# Patient Record
Sex: MALE
[De-identification: names, ages, dates, MRNs, and addresses within clinical notes are randomized per-mention and may not be internally consistent; named-entity substitution may affect disease eponyms.]

---

## 2020-02-20 PROBLEM — Z00.00 ENCOUNTER FOR PREVENTIVE HEALTH EXAMINATION: Status: ACTIVE | Noted: 2020-02-20

## 2020-03-06 ENCOUNTER — APPOINTMENT (OUTPATIENT)
Dept: UROLOGY | Facility: CLINIC | Age: 40
End: 2020-03-06
Payer: COMMERCIAL

## 2020-03-06 VITALS
HEART RATE: 68 BPM | BODY MASS INDEX: 25.76 KG/M2 | SYSTOLIC BLOOD PRESSURE: 144 MMHG | DIASTOLIC BLOOD PRESSURE: 87 MMHG | TEMPERATURE: 98 F | WEIGHT: 170 LBS | HEIGHT: 68 IN

## 2020-03-06 DIAGNOSIS — Z80.43 FAMILY HISTORY OF MALIGNANT NEOPLASM OF TESTIS: ICD-10-CM

## 2020-03-06 DIAGNOSIS — Z78.9 OTHER SPECIFIED HEALTH STATUS: ICD-10-CM

## 2020-03-06 DIAGNOSIS — E23.0 HYPOPITUITARISM: ICD-10-CM

## 2020-03-06 DIAGNOSIS — H40.9 UNSPECIFIED GLAUCOMA: ICD-10-CM

## 2020-03-06 DIAGNOSIS — H35.61 RETINAL HEMORRHAGE, RIGHT EYE: ICD-10-CM

## 2020-03-06 DIAGNOSIS — H35.62 RETINAL HEMORRHAGE, LEFT EYE: ICD-10-CM

## 2020-03-06 DIAGNOSIS — L28.0 LICHEN SIMPLEX CHRONICUS: ICD-10-CM

## 2020-03-06 DIAGNOSIS — H44.20 DEGENERATIVE MYOPIA, UNSPECIFIED EYE: ICD-10-CM

## 2020-03-06 LAB
BILIRUB UR QL STRIP: NORMAL
CLARITY UR: CLEAR
COLLECTION METHOD: NORMAL
GLUCOSE UR-MCNC: NORMAL
HCG UR QL: 0.2 EU/DL
HGB UR QL STRIP.AUTO: NORMAL
KETONES UR-MCNC: NORMAL
LEUKOCYTE ESTERASE UR QL STRIP: NORMAL
NITRITE UR QL STRIP: NORMAL
PH UR STRIP: 7
PROT UR STRIP-MCNC: NORMAL
SP GR UR STRIP: 1.01

## 2020-03-06 PROCEDURE — 99204 OFFICE O/P NEW MOD 45 MIN: CPT

## 2020-03-06 PROCEDURE — 81003 URINALYSIS AUTO W/O SCOPE: CPT | Mod: QW

## 2020-03-06 PROCEDURE — 76857 US EXAM PELVIC LIMITED: CPT

## 2020-03-06 RX ORDER — TIMOLOL MALEATE/LATANOPROST/PF 0.5-0.005%
0.005-0.5 DROPS OPHTHALMIC (EYE)
Refills: 0 | Status: ACTIVE | COMMUNITY

## 2020-03-06 NOTE — HISTORY OF PRESENT ILLNESS
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  He presents with a known urethral stricture identified in 2013 during an evaluation for LUTS (with urodynamics and cystoscopy).  At the age of 13 he was hit by a cricket ball in the genital region, resulting in swelling.  In addition, he had been catheterized for the forearm ORIF.  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 He has one healthy child aged 2.5 years.  His wife is 41 years old. The couple are using condoms.

## 2020-03-06 NOTE — ASSESSMENT
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 He will continue to monitor home flow rates and consider a retrograde urethrogram when he returns to [place].\par \par Regarding elective sterilization, I outlined the vasectomy procedure with Dr. Cardenas, including the risks (bleeding, infection, 0.2-0.5% recannulization, chronic testicular pain, questionable increased risk of prostate cancer), benefits and alternatives. The appropriate instructions and consent were provided. He will decide and schedule if he wishes to proceed with the vasectomy. \par \par (He may be relocating to [facility] from [place]).\par

## 2020-03-06 NOTE — PHYSICAL EXAM
[General Appearance - Well Developed] : well developed [General Appearance - Well Nourished] : well nourished [Normal Appearance] : normal appearance [Well Groomed] : well groomed [General Appearance - In No Acute Distress] : no acute distress [Heart Rate And Rhythm] : Heart rate and rhythm were normal [Edema] : no peripheral edema [Respiration, Rhythm And Depth] : normal respiratory rhythm and effort [Exaggerated Use Of Accessory Muscles For Inspiration] : no accessory muscle use [Abdomen Soft] : soft [Abdomen Tenderness] : non-tender [Abdomen Mass (___ Cm)] : no abdominal mass palpated [Abdomen Hernia] : no hernia was discovered [Costovertebral Angle Tenderness] : no ~M costovertebral angle tenderness [Urethral Meatus] : meatus normal [Penis Abnormality] : normal uncircumcised penis [Urinary Bladder Findings] : the bladder was normal on palpation [Scrotum] : the scrotum was normal [Epididymis] : the epididymides were normal [Testes Tenderness] : no tenderness of the testes [Testes Mass (___cm)] : there were no testicular masses [Prostate Tenderness] : the prostate was not tender [No Prostate Nodules] : no prostate nodules [Normal Station and Gait] : the gait and station were normal for the patient's age [Skin Color & Pigmentation] : normal skin color and pigmentation [] : no rash [No Focal Deficits] : no focal deficits [Oriented To Time, Place, And Person] : oriented to person, place, and time [Affect] : the affect was normal [Mood] : the mood was normal [Not Anxious] : not anxious [No Palpable Adenopathy] : no palpable adenopathy [FreeTextEntry1] : Subcentimeter maculaopapular erythematous subcoronal penile lesion. Tight scrotum with 10cc testes bilaterally. Difficulty palpating right vas deferens

## 2022-01-14 ENCOUNTER — APPOINTMENT (OUTPATIENT)
Dept: UROLOGY | Facility: CLINIC | Age: 42
End: 2022-01-14
Payer: COMMERCIAL

## 2022-01-14 PROCEDURE — 99213 OFFICE O/P EST LOW 20 MIN: CPT | Mod: 95

## 2022-01-14 NOTE — ASSESSMENT
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 He will continue to monitor home flow rates and consider a retrograde urethrogram when he returns to [place], possibly this summer.\par \par Regarding the penile lesions, he will continue under the care of his local dermatologist for these.\par \par

## 2022-01-14 NOTE — LETTER BODY
[Dear  ___] : Dear  [unfilled], [Consult Letter:] : I had the pleasure of evaluating your patient, [unfilled]. [Please see my note below.] : Please see my note below. [Consult Closing:] : Thank you very much for allowing me to participate in the care of this patient.  If you have any questions, please do not hesitate to contact me. [Sincerely,] : Sincerely, [FreeTextEntry3] : Yosvany Estrella MD, FACS

## 2022-01-14 NOTE — PHYSICAL EXAM
[General Appearance - Well Developed] : well developed [General Appearance - Well Nourished] : well nourished [Normal Appearance] : normal appearance [Well Groomed] : well groomed [General Appearance - In No Acute Distress] : no acute distress [Costovertebral Angle Tenderness] : no ~M costovertebral angle tenderness [] : no rash [Edema] : no peripheral edema [Affect] : the affect was normal [Mood] : the mood was normal [Not Anxious] : not anxious [Oriented To Time, Place, And Person] : oriented to person, place, and time [FreeTextEntry1] : Subcentimeter maculopapular erythematous subcoronal penile lesion. Tight scrotum with 10cc testes bilaterally. Difficulty palpating right vas deferens

## 2022-01-14 NOTE — HISTORY OF PRESENT ILLNESS
[FreeTextEntry1] : I contacted Mr. JUDITH CARDENAS by telemedicine using the [employer] platform. The patient was located at his  home address in [place].  The appropriate consent was obtained prior to the conference.  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  At the age of 13 he was hit by a cricket ball in the genital region, resulting in swelling.  In addition, he had been catheterized for the forearm ORIF surgery.  \par \par His voiding symptoms (mostly obstructive) have remained stable and  his home flow rates are still at ~20ml/sec.  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  However he has had several additional lesions involving the glans prepuce and distal penile skin, consistent with lichenoid penile dermatitis.  He has been using triamcinolone cream for up to 2 weeks intermittently with improvement. 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 He has one healthy child aged 2.5 years.  His wife is 41 years old. The couple are using condoms.

## 2022-01-14 NOTE — ADDENDUM
[FreeTextEntry1] : A portion of this note was written by [Jimmie Olsen] on 01/13/2022 acting as a scribe for Dr. Estrella. \par \par I have personally reviewed the chart and agree that the record accurately reflects my personal performance of the history, physical exam, assessment, and plan.

## 2022-03-07 ENCOUNTER — NON-APPOINTMENT (OUTPATIENT)
Age: 42
End: 2022-03-07

## 2022-04-01 ENCOUNTER — APPOINTMENT (OUTPATIENT)
Dept: UROLOGY | Facility: CLINIC | Age: 42
End: 2022-04-01
Payer: COMMERCIAL

## 2022-04-01 VITALS
HEIGHT: 69 IN | BODY MASS INDEX: 25.18 KG/M2 | WEIGHT: 170 LBS | DIASTOLIC BLOOD PRESSURE: 107 MMHG | HEART RATE: 102 BPM | TEMPERATURE: 98.2 F | RESPIRATION RATE: 18 BRPM | SYSTOLIC BLOOD PRESSURE: 162 MMHG

## 2022-04-01 DIAGNOSIS — N35.912 UNSPECIFIED BULBOUS URETHRAL STRICTURE, MALE: ICD-10-CM

## 2022-04-01 DIAGNOSIS — R39.9 UNSPECIFIED SYMPTOMS AND SIGNS INVOLVING THE GENITOURINARY SYSTEM: ICD-10-CM

## 2022-04-01 DIAGNOSIS — N48.9 DISORDER OF PENIS, UNSPECIFIED: ICD-10-CM

## 2022-04-01 DIAGNOSIS — N47.1 PHIMOSIS: ICD-10-CM

## 2022-04-01 PROCEDURE — 51610 INJECTION FOR BLADDER X-RAY: CPT

## 2022-04-01 PROCEDURE — 51741 ELECTRO-UROFLOWMETRY FIRST: CPT

## 2022-04-01 PROCEDURE — 74450 X-RAY URETHRA/BLADDER: CPT

## 2022-04-01 PROCEDURE — 81003 URINALYSIS AUTO W/O SCOPE: CPT | Mod: QW

## 2022-04-01 NOTE — PHYSICAL EXAM
[Abdomen Mass (___ Cm)] : no abdominal mass palpated [Urethral Meatus] : meatus normal [Penis Abnormality] : normal uncircumcised penis [Epididymis] : the epididymides were normal [Testes Tenderness] : no tenderness of the testes [Testes Mass (___cm)] : there were no testicular masses [FreeTextEntry1] : Mild distal foreskin erythema.

## 2022-04-01 NOTE — ASSESSMENT
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  Today's urethral imaging was essentially normal although I am not absolutely certain about the proximal most portion of the bulbar urethra.  This would require a cystoscopy, which I do not feel is necessary in view of his stable urinary flow rates.\par \par We agreed that Dr. Cardenas would continue monitoring flow rates at home and call us for any changes.\par \par Regarding the penile lesions that are being evaluated by a local dermatologist, he will continue with the tacrolimus cream as advised.  He will continue to monitor home flow rates and consider a retrograde urethrogram when he returns to [place], possibly this summer.\par \par

## 2022-04-01 NOTE — ADDENDUM
[FreeTextEntry1] : A portion of this note was written by [Jimmie Olsen] on 03/29/2022 acting as a scribe for Dr. Estrella. \par \par I have personally reviewed the chart and agree that the record accurately reflects my personal performance of the history, physical exam, assessment, and plan.

## 2022-04-01 NOTE — HISTORY OF PRESENT ILLNESS
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  At the age of 13 he was hit by a cricket ball in the genital region, resulting in swelling.  In addition, he had been catheterized for the forearm ORIF surgery.  \par \par His voiding symptoms (mostly obstructive) have remained stable and  his home flow rates are still at ~17-20ml/sec. 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  However he has had several additional lesions involving the glans prepuce and distal penile skin, consistent with lichenoid penile dermatitis.  He had been using triamcinolone cream for up to 2 weeks intermittently with improvement and was recently started on tacrolimus cream.  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 He has one healthy child aged 2.5 years.  His wife is 41 years old. The couple are using condoms.

## 2022-04-04 LAB — BACTERIA UR CULT: NORMAL

## 2022-10-13 PROBLEM — E23.0 HYPOGONADOTROPIC HYPOGONADISM: Status: ACTIVE | Noted: 2020-03-06
